# Patient Record
Sex: MALE | Race: OTHER | HISPANIC OR LATINO | URBAN - METROPOLITAN AREA
[De-identification: names, ages, dates, MRNs, and addresses within clinical notes are randomized per-mention and may not be internally consistent; named-entity substitution may affect disease eponyms.]

---

## 2021-09-07 ENCOUNTER — EMERGENCY (EMERGENCY)
Facility: HOSPITAL | Age: 36
LOS: 1 days | Discharge: HOME | End: 2021-09-07
Attending: EMERGENCY MEDICINE
Payer: MEDICAID

## 2021-09-07 VITALS
HEIGHT: 66 IN | HEART RATE: 99 BPM | SYSTOLIC BLOOD PRESSURE: 147 MMHG | WEIGHT: 229.94 LBS | OXYGEN SATURATION: 97 % | DIASTOLIC BLOOD PRESSURE: 95 MMHG | TEMPERATURE: 99 F | RESPIRATION RATE: 20 BRPM

## 2021-09-07 PROCEDURE — 10060 I&D ABSCESS SIMPLE/SINGLE: CPT

## 2021-09-07 PROCEDURE — 99283 EMERGENCY DEPT VISIT LOW MDM: CPT | Mod: 25

## 2021-09-07 PROCEDURE — 10060 I&D ABSCESS SIMPLE/SINGLE: CPT | Mod: 54,LT

## 2021-09-07 PROCEDURE — 99282 EMERGENCY DEPT VISIT SF MDM: CPT | Mod: 25

## 2021-09-07 NOTE — ED STATDOCS - MUSCULOSKELETAL, MLM
right forth digit chronic changes at base of nail with collection of pus to lateral aspect to nail, TTP. Full ROM of all digits

## 2021-09-07 NOTE — ED STATDOCS - NS_ ATTENDINGSCRIBEDETAILS _ED_A_ED_FT
I, João Gupta, performed the initial face to face bedside interview with this patient regarding history of present illness, review of symptoms and relevant past medical, social and family history.  I completed an independent physical examination.  I was the initial provider who evaluated this patient. I have signed out the follow up of any pending tests (i.e. labs, radiological studies) to the ACP.  I have communicated the patient’s plan of care and disposition with the ACP.  The history, relevant review of systems, past medical and surgical history, medical decision making, and physical examination was documented by the scribe in my presence and I attest to the accuracy of the documentation.

## 2021-09-07 NOTE — ED ADULT NURSE NOTE - OBJECTIVE STATEMENT
Pt ambulatory to ED c/o right ring finger pain.  Patient denies any trauma/injury to area.  Patient seen and discharged by PA.

## 2021-09-07 NOTE — ED STATDOCS - PATIENT PORTAL LINK FT
You can access the FollowMyHealth Patient Portal offered by [facility] by registering at the following website: [email or web address]. By joining [employer]’s FollowMyHealth portal, you will also be able to view your health information using other applications (apps) compatible with our system.

## 2021-09-07 NOTE — ED STATDOCS - CARE PROVIDER_API CALL
Andrew Melgar)  Plastic Surgery  [address] [place] [address]  Phone: [phone]  Fax: [phone]  Follow Up Time:

## 2021-09-07 NOTE — ED ADULT TRIAGE NOTE - CHIEF COMPLAINT QUOTE
Pt ambulatory into triage, c/o pain to right ring finger, denies injury/trauma to area, states finger is infected x 2 weeks

## 2021-09-07 NOTE — ED STATDOCS - PROGRESS NOTE DETAILS
pt with chronic infections of finger instructed pt to follow up with specialist, incision and drainage, removed part of nail pt explained dc instructions wound care explianed

## 2021-09-07 NOTE — ED STATDOCS - OBJECTIVE STATEMENT
37y/o M presents to the ED c/o right finger infection which began 2+ weeks ago. Assoc swelling and pain.

## 2021-09-15 NOTE — ED PROCEDURE NOTE - ATTENDING CONTRIBUTION TO CARE
I, João Gupta, performed a face to face bedside interview with this patient regarding history of present illness, review of symptoms and relevant past medical, social and family history.  I completed an independent physical examination. I have communicated the patient’s plan of care and disposition with the ACP.

## 2025-05-06 NOTE — ED ADULT NURSE NOTE - SUICIDE SCREENING QUESTION 1
[FreeTextEntry1] : 46yo P2 with h/o RA/hashimtos/anemia/HLD/PreDM/ pituitary adenoma- presents to re-establish care/ annual gyn  # hypothalamic hypogonadism (amenn x 1 yr) - labs / MRI reviewed - just started on cabergoline and inc dose of levothyroxine-  will give time to see if menses resumes. - although labs indicative of hypoestrogenic state-  [ ]provera withdrawl and [ ]pelvic sono to eval em lining after to make sure period of unopposed estrogen not missed. - if no spont return to ovulation in 3-4 months after treatment of underlying issues- need to discuss hormone replacement  - vag estrace for symptomatic relief.  - will call pt w/ pelvic sono result - if thickened em (unlikely)- would recommend EMB  # STD eval  #vag itching - [ ]NAAT/GC.  # HCM - [ ]pap/ hpv collected - [ ]mammo /br sono - Paragard in place- strings visible, good until 2028 - PHQ9 reviewed face to face (5 min) no signs of depression. Will f/u for changes in mood/ anxiety - gen health followed by rheum/med.  [ ]email sent for endo referral to be expedited  WIll call w/ pelvic sono RTC 3 months if no menses Doug, PAC
No